# Patient Record
Sex: FEMALE | Race: OTHER
[De-identification: names, ages, dates, MRNs, and addresses within clinical notes are randomized per-mention and may not be internally consistent; named-entity substitution may affect disease eponyms.]

---

## 2020-02-04 ENCOUNTER — HOSPITAL ENCOUNTER (EMERGENCY)
Dept: HOSPITAL 72 - EMR | Age: 30
Discharge: HOME | End: 2020-02-04
Payer: MEDICAID

## 2020-02-04 VITALS — DIASTOLIC BLOOD PRESSURE: 79 MMHG | SYSTOLIC BLOOD PRESSURE: 119 MMHG

## 2020-02-04 VITALS — HEIGHT: 64 IN | BODY MASS INDEX: 26.29 KG/M2 | WEIGHT: 154 LBS

## 2020-02-04 VITALS — SYSTOLIC BLOOD PRESSURE: 119 MMHG | DIASTOLIC BLOOD PRESSURE: 79 MMHG

## 2020-02-04 DIAGNOSIS — S16.1XXA: Primary | ICD-10-CM
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
## 2020-02-04 DIAGNOSIS — B69.0: ICD-10-CM

## 2020-02-04 DIAGNOSIS — S00.93XA: ICD-10-CM
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
## 2020-02-04 PROCEDURE — 72125 CT NECK SPINE W/O DYE: CPT

## 2020-02-04 PROCEDURE — 99284 EMERGENCY DEPT VISIT MOD MDM: CPT

## 2020-02-04 PROCEDURE — 81025 URINE PREGNANCY TEST: CPT

## 2020-02-04 PROCEDURE — 70450 CT HEAD/BRAIN W/O DYE: CPT
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
## 2020-02-04 NOTE — DIAGNOSTIC IMAGING REPORT
Indication: Cervical trauma/pain.

 

 

Technique: Continuous helical imaging of the cervical spine was obtained transaxially

from the skull base to the upper thoracic spine. 2-D coronal and sagittal reformatted

images were obtained. Automatic Exposure Control was utilized.

 

 

Total Dose length Product (DLP):  119.6 mGycm

 

CT Dose Index Volume (CTDIvol):   4.6 mGy

 

Comparison: None

 

Findings: There is no evidence of an acute fracture or malalignment. Atlantoaxial

alignment appears normal. Height and configuration of the vertebral bodies and

intervertebral discs are within normal limits. Uncovertebral joints and facets are

unremarkable. There is no soft tissue swelling.

 

 

Impression: Negative cervical spine CT

 

 

 

The CT scanner at [facility] is accredited by the [employer] College of

[employer] and the scans are performed using dose optimization techniques as

appropriate to a performed exam including Automatic Exposure control.

## 2020-02-04 NOTE — NUR
ED Nurse Note:



Pt cleared by health care Provider for discharge.  DC instructions/prescription 
was given and explained to pt and verbalized understanding of teachings. All 
medical devices such as ID band  removed. Pt is AAO x4, ambulatory and left 
with all personal belongings.

## 2020-02-04 NOTE — DIAGNOSTIC IMAGING REPORT
Indication: Headache. Head trauma

 

Technique: Contiguous 5 mm thick transaxial imaging of the head obtained in a Siemens

Sensation 64 slice CT scanner.  Soft tissue and bone windows generated.  Automatic

Exposure Control was utilized.

 

 

Total Dose length Product (DLP):  1394.9 mGycm

 

CT Dose Index Volume (CTDIvol):   62.7 mGy

 

Comparison: none

 

Findings: The size and configuration of the cortical sulci, basal cisterns, and

ventricles are within normal limits for age. There is no mass effect, midline shift,

or edema identified. There are a few punctate calcifications within the brain

parenchyma, which may be due to cysticercosis. There is no evidence of acute

hemorrhage or abnormal intra-axial or extra-axial fluid collections. The bones and

soft tissues are unremarkable. There is soft tissue swelling over the right

supraorbital and frontal scalp consistent with a contusion injury.

 

Impression: No mass effect, edema or acute bleed.

 

Right supraorbital/frontal scalp hematoma

 

Few parenchymal calcifications which may be due to cysticercosis.

 

The CT scanner at [facility] is accredited by the [employer] College of

Radiology and the scans are performed using dose optimization techniques as

appropriate to a performed exam including Automatic Exposure control.

## 2020-09-29 ENCOUNTER — HOSPITAL ENCOUNTER (EMERGENCY)
Dept: HOSPITAL 72 - EMR | Age: 30
Discharge: HOME | End: 2020-09-29
Payer: COMMERCIAL

## 2020-09-29 VITALS — BODY MASS INDEX: 24.11 KG/M2 | WEIGHT: 150 LBS | HEIGHT: 66 IN

## 2020-09-29 VITALS — SYSTOLIC BLOOD PRESSURE: 133 MMHG | DIASTOLIC BLOOD PRESSURE: 85 MMHG

## 2020-09-29 VITALS — SYSTOLIC BLOOD PRESSURE: 129 MMHG | DIASTOLIC BLOOD PRESSURE: 91 MMHG

## 2020-09-29 DIAGNOSIS — Z3A.08: ICD-10-CM

## 2020-09-29 DIAGNOSIS — O20.0: Primary | ICD-10-CM

## 2020-09-29 LAB
ADD MANUAL DIFF: NO
ALBUMIN SERPL-MCNC: 3.7 G/DL (ref 3.4–5)
ALBUMIN/GLOB SERPL: 0.9 {RATIO} (ref 1–2.7)
ALP SERPL-CCNC: 58 U/L (ref 46–116)
ALT SERPL-CCNC: 12 U/L (ref 12–78)
ANION GAP SERPL CALC-SCNC: 11 MMOL/L (ref 5–15)
APPEARANCE UR: CLEAR
APTT PPP: YELLOW S
AST SERPL-CCNC: 13 U/L (ref 15–37)
BASOPHILS NFR BLD AUTO: 0.6 % (ref 0–2)
BILIRUB SERPL-MCNC: 0.2 MG/DL (ref 0.2–1)
BUN SERPL-MCNC: 15 MG/DL (ref 7–18)
CALCIUM SERPL-MCNC: 9.2 MG/DL (ref 8.5–10.1)
CHLORIDE SERPL-SCNC: 104 MMOL/L (ref 98–107)
CO2 SERPL-SCNC: 23 MMOL/L (ref 21–32)
CREAT SERPL-MCNC: 0.7 MG/DL (ref 0.55–1.3)
EOSINOPHIL NFR BLD AUTO: 0.8 % (ref 0–3)
ERYTHROCYTE [DISTWIDTH] IN BLOOD BY AUTOMATED COUNT: 13.4 % (ref 11.6–14.8)
GLOBULIN SER-MCNC: 3.9 G/DL
GLUCOSE UR STRIP-MCNC: NEGATIVE MG/DL
HCT VFR BLD CALC: 36 % (ref 37–47)
HGB BLD-MCNC: 12.3 G/DL (ref 12–16)
KETONES UR QL STRIP: NEGATIVE
LEUKOCYTE ESTERASE UR QL STRIP: (no result)
LYMPHOCYTES NFR BLD AUTO: 30.5 % (ref 20–45)
MCV RBC AUTO: 87 FL (ref 80–99)
MONOCYTES NFR BLD AUTO: 5 % (ref 1–10)
NEUTROPHILS NFR BLD AUTO: 63.1 % (ref 45–75)
NITRITE UR QL STRIP: NEGATIVE
PH UR STRIP: 6 [PH] (ref 4.5–8)
PLATELET # BLD: 437 K/UL (ref 150–450)
POTASSIUM SERPL-SCNC: 3.6 MMOL/L (ref 3.5–5.1)
PROT UR QL STRIP: (no result)
RBC # BLD AUTO: 4.15 M/UL (ref 4.2–5.4)
SODIUM SERPL-SCNC: 138 MMOL/L (ref 136–145)
SP GR UR STRIP: 1.02 (ref 1–1.03)
UROBILINOGEN UR-MCNC: NORMAL MG/DL (ref 0–1)
WBC # BLD AUTO: 13.1 K/UL (ref 4.8–10.8)

## 2020-09-29 PROCEDURE — 86850 RBC ANTIBODY SCREEN: CPT

## 2020-09-29 PROCEDURE — 96360 HYDRATION IV INFUSION INIT: CPT

## 2020-09-29 PROCEDURE — 85025 COMPLETE CBC W/AUTO DIFF WBC: CPT

## 2020-09-29 PROCEDURE — 84702 CHORIONIC GONADOTROPIN TEST: CPT

## 2020-09-29 PROCEDURE — 86900 BLOOD TYPING SEROLOGIC ABO: CPT

## 2020-09-29 PROCEDURE — 80053 COMPREHEN METABOLIC PANEL: CPT

## 2020-09-29 PROCEDURE — 81003 URINALYSIS AUTO W/O SCOPE: CPT

## 2020-09-29 PROCEDURE — 86901 BLOOD TYPING SEROLOGIC RH(D): CPT

## 2020-09-29 PROCEDURE — 96361 HYDRATE IV INFUSION ADD-ON: CPT

## 2020-09-29 PROCEDURE — 36415 COLL VENOUS BLD VENIPUNCTURE: CPT

## 2020-09-29 PROCEDURE — 99284 EMERGENCY DEPT VISIT MOD MDM: CPT

## 2020-09-29 PROCEDURE — 76801 OB US < 14 WKS SINGLE FETUS: CPT

## 2020-09-29 PROCEDURE — 76817 TRANSVAGINAL US OBSTETRIC: CPT

## 2020-09-29 NOTE — EMERGENCY ROOM REPORT
History of Present Illness


General


Chief Complaint:  Female Urogenital Problems


Source:  Patient





Present Illness


HPI


The patient is  at 12 weeks estimated gestational age.  She is being 

followed in outpatient clinic for the pregnancy.  She is 12 weeks based on 

dates.  She has not had an ultrasound.  She states that about 2 hours ago she 

noted some vaginal spotting.  She denies pain.  She denies abnormal vaginal 

discharge.  She has not had sexual intercourse within the last 24 hours.  She 

denies dysuria or hematuria.  She has no other complaints.


Allergies:  


Coded Allergies:  


     No Known Allergies (Unverified , 18)





COVID-19 Screening


Contact w/high risk pt:  No


Experienced COVID-19 symptoms?:  No


COVID-19 Testing performed PTA:  No





Patient History


Past Medical History:  none, see triage record


Social History:  Denies: smoking, alcohol use, drug use


Pregnant Now:  Yes


:  2


Para:  1


Reviewed Nursing Documentation:  PMH: Agreed; PSxH: Agreed





Nursing Documentation-PMH


Past Medical History:  No Stated History





Review of Systems


All Other Systems:  negative except mentioned in HPI





Physical Exam





Vital Signs








  Date Time  Temp Pulse Resp B/P (MAP) Pulse Ox O2 Delivery O2 Flow Rate FiO2


 


20 19:40 98.2 88 20 129/91 (104) 99 Room Air  








Sp02 EP Interpretation:  reviewed, normal


General Appearance:  no apparent distress, alert, GCS 15, non-toxic


Head:  normocephalic, atraumatic


Eyes:  bilateral eye normal inspection


ENT:  hearing grossly normal, no angioedema, normal voice


Neck:  normal inspection, full range of motion


Respiratory:  no respiratory distress, no retraction, no accessory muscle use, 

speaking full sentences


Cardiovascular #1:  regular rate, rhythm, no edema


Gastrointestinal:  normal inspection, non tender, soft, non-distended, no 

guarding, no rebound


Rectal:  deferred


Musculoskeletal:  back normal, normal range of motion, gait/station normal, non-

tender


Neurologic:  alert, motor strength/tone normal, oriented x3, sensory intact, 

responsive, speech normal


Psychiatric:  judgement/insight normal, memory normal, mood/affect normal, no 

suicidal/homicidal ideation


Skin:  no rash, normal color





Medical Decision Making


Diagnostic Impression:  


   Primary Impression:  


   Threatened 


   Additional Impressions:  


   First trimester bleeding


   Possible fetal demise


ER Course


The ultrasound for this patient showed a fetal pole, but no fetal heart tones or

yolk sac.  Given the patient's hCG of 19,000 and the estimated gestational age 

by dates, I am concerned, that this is a fetal demise.  The fetal pole is 

reading approximately 8 weeks.  I would expect to see fetal heart tones at this 

time.  It is possible, that this patient is much earlier than she realizes and 

that this is a very early pregnancy.  However, I am concerned about fetal 

demise.  Patient just began having vaginal spotting and therefore may 

spontaneously pass the pregnancy, however given that I cannot be sure on the 

gestational age, I will have this patient follow on with her primary care clinic

to undergo close monitoring and if necessary pregnancy termination.  The patient

was educated that she must follow-up within the next 24 hours at her primary 

clinic.





Of note: The patient is O+ and so does not require RhoGam.





Laboratory Tests








Test


 20


20:10


 


White Blood Count


 13.1 K/UL


(4.8-10.8)  H


 


Red Blood Count


 4.15 M/UL


(4.20-5.40)  L


 


Hemoglobin


 12.3 G/DL


(12.0-16.0)


 


Hematocrit


 36.0 %


(37.0-47.0)  L


 


Mean Corpuscular Volume 87 FL (80-99)  


 


Mean Corpuscular Hemoglobin


 29.5 PG


(27.0-31.0)


 


Mean Corpuscular Hemoglobin


Concent 34.1 G/DL


(32.0-36.0)


 


Red Cell Distribution Width


 13.4 %


(11.6-14.8)


 


Platelet Count


 437 K/UL


(150-450)


 


Mean Platelet Volume


 6.3 FL


(6.5-10.1)  L


 


Neutrophils (%) (Auto)


 63.1 %


(45.0-75.0)


 


Lymphocytes (%) (Auto)


 30.5 %


(20.0-45.0)


 


Monocytes (%) (Auto)


 5.0 %


(1.0-10.0)


 


Eosinophils (%) (Auto)


 0.8 %


(0.0-3.0)


 


Basophils (%) (Auto)


 0.6 %


(0.0-2.0)


 


Urine Color Yellow  


 


Urine Appearance Clear  


 


Urine pH 6 (4.5-8.0)  


 


Urine Specific Gravity


 1.020


(1.005-1.035)


 


Urine Protein


 1+ (NEGATIVE)


H


 


Urine Glucose (UA)


 Negative


(NEGATIVE)


 


Urine Ketones


 Negative


(NEGATIVE)


 


Urine Blood


 5+ (NEGATIVE)


H


 


Urine Nitrite


 Negative


(NEGATIVE)


 


Urine Bilirubin


 Negative


(NEGATIVE)


 


Urine Urobilinogen


 Normal MG/DL


(0.0-1.0)


 


Urine Leukocyte Esterase


 1+ (NEGATIVE)


H


 


Urine RBC


 15-20 /HPF (0


- 2)  H


 


Urine WBC


 0-2 /HPF (0 -


2)


 


Urine Squamous Epithelial


Cells Occasional


/LPF


 


Urine Bacteria


 Occasional


/HPF (NONE)


 


Urine Mucus


 Few /LPF


(NONE/OCC)  H


 


Sodium Level


 138 MMOL/L


(136-145)


 


Potassium Level


 3.6 MMOL/L


(3.5-5.1)


 


Chloride Level


 104 MMOL/L


()


 


Carbon Dioxide Level


 23 MMOL/L


(21-32)


 


Anion Gap


 11 mmol/L


(5-15)


 


Blood Urea Nitrogen


 15 mg/dL


(7-18)


 


Creatinine


 0.7 MG/DL


(0.55-1.30)


 


Estimated Glomerular


Filtration Rate > 60 mL/min


(>60)


 


Glucose Level


 93 MG/DL


()


 


Calcium Level


 9.2 MG/DL


(8.5-10.1)


 


Total Bilirubin


 0.2 MG/DL


(0.2-1.0)


 


Aspartate Amino Transferase


(AST) 13 U/L (15-37)


L


 


Alanine Aminotransferase (ALT)


 12 U/L (12-78)





 


Alkaline Phosphatase


 58 U/L


()


 


Total Protein


 7.6 G/DL


(6.4-8.2)


 


Albumin


 3.7 G/DL


(3.4-5.0)


 


Globulin 3.9 g/dL  


 


Albumin/Globulin Ratio


 0.9 (1.0-2.7)


L


 


Human Chorionic Gonadotropin,


Quant 52354 mIU/mL


(1-6)  H








CT/MRI/US Diagnostic Results


CT/MRI/US Diagnostic Results :  


   Imaging Test Ordered:  Pelvic US 1st trimester:


   Impression


IUP with fetal pole, but no yolk sac or FHT.  See official report in the 

electronic medical record.





Last Vital Signs








  Date Time  Temp Pulse Resp B/P (MAP) Pulse Ox O2 Delivery O2 Flow Rate FiO2


 


20 19:40 98.2 88 20 129/91 (104) 99 Room Air  








Status:  improved


Disposition:  HOME, SELF-CARE


Condition:  Stable


Referrals:  


JOSELINE GARCIA,REFERRING (PCP)











Sonam Figueroa DO            Sep 29, 2020 20:32

## 2020-09-29 NOTE — DIAGNOSTIC IMAGING REPORT
EXAM:

 

  US Pregnancy, Transvaginal

 

CLINICAL HISTORY:

  ABD PAIN

 

TECHNIQUE:

 

 Real-time transvaginal obstetrical ultrasound of the maternal pelvis and 

a first trimester pregnancy with image documentation.  Transvaginal 

imaging was used for better evaluation of the fetus and adnexa.

 

COMPARISON:

  No relevant prior studies available.

 

FINDINGS:

 

Intrauterine gestational sac identified containing a fetal pole.  

The crown-rump length is 1.8 cm corresponding to an ultrasound date of 8 

weeks and 2 days.  No yolk sac visualized.

 

No fetal heart rate is detected.

 

Normal grayscale and color Doppler appearance of the right ovary.

The left ovary was unable to be visualized.

 

No free fluid identified within the of the cul-de-sac.

 

IMPRESSION:     

 

1.  Intrauterine pregnancy with ultrasound dating of 8 weeks and 2 days.  

No fetal heart rate/cardiac activity seen, which is expected in a 

pregnancy of this age.  Findings may represent a failed pregnancy but 

correlate with serial beta-hCG and follow-up ultrasound for confirmation.

 

2.  Normal appearance of the right ovary.  The left ovary was unable to 

be visualized.

## 2020-09-29 NOTE — DIAGNOSTIC IMAGING REPORT
EXAM:

 

US Pregnancy, Transvaginal

 

CLINICAL HISTORY:

 

ABD PAIN

 

TECHNIQUE:

 

Real-time transvaginal obstetrical ultrasound of the maternal pelvis and 

a first trimester pregnancy with image documentation. Transvaginal 

imaging was used for better evaluation of the fetus and adnexa.

 

COMPARISON:

 

No relevant prior studies available.

 

FINDINGS:

 

Intrauterine gestational sac identified containing a fetal pole. 

The crown-rump length is 1.8 cm corresponding to an ultrasound date of 8 

weeks and 2 days. 

No yolk sac visualized.

 

No fetal heart rate is detected.

 

Normal grayscale and color Doppler appearance of the right ovary.

The left ovary was unable to be visualized.

 

No free fluid identified within the of the pelvic cul-de-sac.

 

IMPRESSION: 

 

1. Intrauterine pregnancy with ultrasound dating of 8 weeks and 2 days. 

No fetal heart rate/cardiac activity seen, which is expected in a 

pregnancy of this age. Findings may represent a failed pregnancy but 

correlate with serial beta-hCG and follow-up ultrasound for confirmation.

 

2. Normal appearance of the right ovary. The left ovary was unable to be 

visualized.